# Patient Record
Sex: MALE | Race: WHITE | Employment: UNEMPLOYED | URBAN - METROPOLITAN AREA
[De-identification: names, ages, dates, MRNs, and addresses within clinical notes are randomized per-mention and may not be internally consistent; named-entity substitution may affect disease eponyms.]

---

## 2024-07-27 ENCOUNTER — APPOINTMENT (OUTPATIENT)
Dept: ULTRASOUND IMAGING | Facility: HOSPITAL | Age: 29
End: 2024-07-27
Attending: EMERGENCY MEDICINE
Payer: MEDICAID

## 2024-07-27 ENCOUNTER — HOSPITAL ENCOUNTER (INPATIENT)
Facility: HOSPITAL | Age: 29
LOS: 2 days | Discharge: HOME | End: 2024-07-29
Attending: EMERGENCY MEDICINE | Admitting: INTERNAL MEDICINE
Payer: MEDICAID

## 2024-07-27 DIAGNOSIS — B17.9 ACUTE HEPATITIS: Primary | ICD-10-CM

## 2024-07-27 DIAGNOSIS — R79.89 HIGH SERUM FERRITIN: ICD-10-CM

## 2024-07-27 DIAGNOSIS — F10.10 ALCOHOL ABUSE: ICD-10-CM

## 2024-07-27 PROBLEM — F10.20 UNCOMPLICATED ALCOHOL DEPENDENCE (HCC): Status: ACTIVE | Noted: 2024-07-27

## 2024-07-27 PROBLEM — D69.6 THROMBOCYTOPENIA (HCC): Status: ACTIVE | Noted: 2024-07-27

## 2024-07-27 LAB
ALBUMIN SERPL-MCNC: 3.6 G/DL (ref 3.2–4.8)
ALBUMIN/GLOB SERPL: 1.3 {RATIO} (ref 1–2)
ALP LIVER SERPL-CCNC: 328 U/L
ALT SERPL-CCNC: 155 U/L
AMPHET UR QL SCN: NEGATIVE
ANION GAP SERPL CALC-SCNC: 8 MMOL/L (ref 0–18)
APTT PPP: 22.7 SECONDS (ref 23–36)
AST SERPL-CCNC: 368 U/L (ref ?–34)
BASOPHILS # BLD AUTO: 0.03 X10(3) UL (ref 0–0.2)
BASOPHILS NFR BLD AUTO: 0.5 %
BENZODIAZ UR QL SCN: NEGATIVE
BILIRUB SERPL-MCNC: 17.6 MG/DL (ref 0.3–1.2)
BILIRUB UR QL CFM: POSITIVE
BUN BLD-MCNC: 9 MG/DL (ref 9–23)
CALCIUM BLD-MCNC: 9.3 MG/DL (ref 8.7–10.4)
CANNABINOIDS UR QL SCN: NEGATIVE
CERULOPLASMIN SERPL-MCNC: 29 MG/DL (ref 20–60)
CHLORIDE SERPL-SCNC: 102 MMOL/L (ref 98–112)
CK SERPL-CCNC: 285 U/L
CLARITY UR REFRACT.AUTO: CLEAR
CO2 SERPL-SCNC: 22 MMOL/L (ref 21–32)
COCAINE UR QL: NEGATIVE
CREAT BLD-MCNC: 0.99 MG/DL
CREAT UR-SCNC: 41 MG/DL
DEPRECATED HBV CORE AB SER IA-ACNC: 2067.7 NG/ML
EGFRCR SERPLBLD CKD-EPI 2021: 106 ML/MIN/1.73M2 (ref 60–?)
EOSINOPHIL # BLD AUTO: 0.03 X10(3) UL (ref 0–0.7)
EOSINOPHIL NFR BLD AUTO: 0.5 %
ERYTHROCYTE [DISTWIDTH] IN BLOOD BY AUTOMATED COUNT: 17.8 %
ETHANOL SERPL-MCNC: <3 MG/DL (ref ?–3)
FENTANYL UR QL SCN: NEGATIVE
GLOBULIN PLAS-MCNC: 2.8 G/DL (ref 2–3.5)
GLUCOSE BLD-MCNC: 114 MG/DL (ref 70–99)
GLUCOSE UR STRIP.AUTO-MCNC: NORMAL MG/DL
HAV IGM SER QL: NONREACTIVE
HBV CORE IGM SER QL: NONREACTIVE
HBV SURFACE AG SERPL QL IA: NONREACTIVE
HCT VFR BLD AUTO: 34.3 %
HCV AB SERPL QL IA: NONREACTIVE
HGB BLD-MCNC: 13 G/DL
IMM GRANULOCYTES # BLD AUTO: 0.04 X10(3) UL (ref 0–1)
IMM GRANULOCYTES NFR BLD: 0.7 %
INR BLD: 0.94 (ref 0.8–1.2)
IRON SATN MFR SERPL: 83 %
IRON SERPL-MCNC: 156 UG/DL
KETONES UR STRIP.AUTO-MCNC: NEGATIVE MG/DL
LEUKOCYTE ESTERASE UR QL STRIP.AUTO: NEGATIVE
LIPASE SERPL-CCNC: 78 U/L (ref 12–53)
LYMPHOCYTES # BLD AUTO: 0.67 X10(3) UL (ref 1–4)
LYMPHOCYTES NFR BLD AUTO: 11.7 %
MCH RBC QN AUTO: 31.2 PG (ref 26–34)
MCHC RBC AUTO-ENTMCNC: 37.9 G/DL (ref 31–37)
MCV RBC AUTO: 82.3 FL
MDMA UR QL SCN: NEGATIVE
MONOCYTES # BLD AUTO: 0.45 X10(3) UL (ref 0.1–1)
MONOCYTES NFR BLD AUTO: 7.8 %
NEUTROPHILS # BLD AUTO: 4.53 X10 (3) UL (ref 1.5–7.7)
NEUTROPHILS # BLD AUTO: 4.53 X10(3) UL (ref 1.5–7.7)
NEUTROPHILS NFR BLD AUTO: 78.8 %
NITRITE UR QL STRIP.AUTO: NEGATIVE
OPIATES UR QL SCN: NEGATIVE
OSMOLALITY SERPL CALC.SUM OF ELEC: 274 MOSM/KG (ref 275–295)
OXYCODONE UR QL SCN: NEGATIVE
PH UR STRIP.AUTO: 7 [PH] (ref 5–8)
PLATELET # BLD AUTO: 79 10(3)UL (ref 150–450)
PLATELET MORPHOLOGY: NORMAL
PLATELETS.RETICULATED NFR BLD AUTO: 5.6 % (ref 0–7)
POTASSIUM SERPL-SCNC: 3.2 MMOL/L (ref 3.5–5.1)
PROT SERPL-MCNC: 6.4 G/DL (ref 5.7–8.2)
PROT UR STRIP.AUTO-MCNC: NEGATIVE MG/DL
PROTHROMBIN TIME: 12.5 SECONDS (ref 11.6–14.8)
RBC # BLD AUTO: 4.17 X10(6)UL
RBC UR QL AUTO: NEGATIVE
SODIUM SERPL-SCNC: 132 MMOL/L (ref 136–145)
SP GR UR STRIP.AUTO: 1.01 (ref 1–1.03)
TOTAL IRON BINDING CAPACITY: 188 UG/DL (ref 250–425)
TRANSFERRIN SERPL-MCNC: 157 MG/DL (ref 215–365)
UROBILINOGEN UR STRIP.AUTO-MCNC: NORMAL MG/DL
WBC # BLD AUTO: 5.8 X10(3) UL (ref 4–11)

## 2024-07-27 PROCEDURE — 76700 US EXAM ABDOM COMPLETE: CPT | Performed by: EMERGENCY MEDICINE

## 2024-07-27 PROCEDURE — 99222 1ST HOSP IP/OBS MODERATE 55: CPT | Performed by: INTERNAL MEDICINE

## 2024-07-27 RX ORDER — ONDANSETRON 2 MG/ML
4 INJECTION INTRAMUSCULAR; INTRAVENOUS EVERY 6 HOURS PRN
Status: DISCONTINUED | OUTPATIENT
Start: 2024-07-27 | End: 2024-07-29

## 2024-07-27 RX ORDER — LORAZEPAM 1 MG/1
1 TABLET ORAL ONCE
Status: COMPLETED | OUTPATIENT
Start: 2024-07-27 | End: 2024-07-27

## 2024-07-27 RX ORDER — MULTIPLE VITAMINS W/ MINERALS TAB 9MG-400MCG
1 TAB ORAL DAILY
Status: DISCONTINUED | OUTPATIENT
Start: 2024-07-27 | End: 2024-07-29

## 2024-07-27 RX ORDER — LORAZEPAM 1 MG/1
1 TABLET ORAL
Status: DISCONTINUED | OUTPATIENT
Start: 2024-07-27 | End: 2024-07-29

## 2024-07-27 RX ORDER — MELATONIN
100 DAILY
Status: DISCONTINUED | OUTPATIENT
Start: 2024-07-28 | End: 2024-07-29

## 2024-07-27 RX ORDER — THIAMINE HYDROCHLORIDE 100 MG/ML
100 INJECTION, SOLUTION INTRAMUSCULAR; INTRAVENOUS ONCE
Status: COMPLETED | OUTPATIENT
Start: 2024-07-27 | End: 2024-07-27

## 2024-07-27 RX ORDER — FOLIC ACID 1 MG/1
1 TABLET ORAL DAILY
Status: DISCONTINUED | OUTPATIENT
Start: 2024-07-27 | End: 2024-07-29

## 2024-07-27 RX ORDER — ENOXAPARIN SODIUM 100 MG/ML
40 INJECTION SUBCUTANEOUS DAILY
Status: DISCONTINUED | OUTPATIENT
Start: 2024-07-27 | End: 2024-07-29

## 2024-07-27 RX ORDER — LORAZEPAM 1 MG/1
2 TABLET ORAL
Status: DISCONTINUED | OUTPATIENT
Start: 2024-07-27 | End: 2024-07-29

## 2024-07-27 RX ORDER — SODIUM CHLORIDE 9 MG/ML
INJECTION, SOLUTION INTRAVENOUS CONTINUOUS
Status: DISCONTINUED | OUTPATIENT
Start: 2024-07-27 | End: 2024-07-29

## 2024-07-27 RX ORDER — MELATONIN
3 NIGHTLY PRN
Status: DISCONTINUED | OUTPATIENT
Start: 2024-07-27 | End: 2024-07-29

## 2024-07-27 RX ORDER — POTASSIUM CHLORIDE 20 MEQ/1
40 TABLET, EXTENDED RELEASE ORAL ONCE
Status: COMPLETED | OUTPATIENT
Start: 2024-07-27 | End: 2024-07-27

## 2024-07-27 RX ORDER — POTASSIUM CHLORIDE 20 MEQ/1
40 TABLET, EXTENDED RELEASE ORAL ONCE
Status: DISCONTINUED | OUTPATIENT
Start: 2024-07-27 | End: 2024-07-27

## 2024-07-27 RX ORDER — PROCHLORPERAZINE EDISYLATE 5 MG/ML
5 INJECTION INTRAMUSCULAR; INTRAVENOUS EVERY 8 HOURS PRN
Status: DISCONTINUED | OUTPATIENT
Start: 2024-07-27 | End: 2024-07-29

## 2024-07-27 NOTE — ED PROVIDER NOTES
Patient Seen in: [facility] Emergency Department      History     Chief Complaint   Patient presents with    Pain     Gluteal pain bilaterally    Eye Visual Problem     States eyes are yellow    Fever     Feels feverish    Urinary Symptoms     States urine is orange     Stated Complaint: states \"yellow eyes\" and \"glutes hurt\" feels feverish    Subjective:   HPI    29-year-old male presents for evaluation of yellowing eyes over the past 7 days.  He felt weak yesterday at work.  States his back muscles hurt.  He feels feverish but has not checked his temperature.  Denies cough or cold.  Denies abdominal pain, vomiting or diarrhea.  Patient does admit to drinking at least 5 beers per day if not more.    Objective:   History reviewed. No pertinent past medical history.           History reviewed. No pertinent surgical history.             Social History     Socioeconomic History    Marital status: Single   Tobacco Use    Smoking status: Every Day     Types: Cigarettes    Smokeless tobacco: Never   Vaping Use    Vaping status: Never Used   Substance and Sexual Activity    Alcohol use: Yes     Comment: 5 beers a day    Drug use: Never              Review of Systems    Positive for stated Chief Complaint: Pain (Gluteal pain bilaterally), Eye Visual Problem (States eyes are yellow), Fever (Feels feverish), and Urinary Symptoms (States urine is orange)    Other systems are as noted in HPI.  Constitutional and vital signs reviewed.      All other systems reviewed and negative except as noted above.    Physical Exam     ED Triage Vitals [07/27/24 0848]   /86   Pulse 82   Resp 18   Temp 97 °F (36.1 °C)   Temp src Temporal   SpO2 98 %   O2 Device None (Room air)       Current Vitals:   Vital Signs  BP: 139/89  Pulse: 78  Resp: 19  Temp: 97 °F (36.1 °C)  Temp src: Temporal  MAP (mmHg): (!) 104    Oxygen Therapy  SpO2: 98 %  O2 Device: None (Room air)            Physical Exam    General: Alert, oriented, no apparent  distress  HEENT:  Pupils equal reactive.  Extraocular motions intact. MMM.  Scleral icterus  Neck: Supple  Lungs: Clear to auscultation bilaterally.  Heart: Regular rate and rhythm.  Abdomen: Soft, nontender.   Skin: No rash.  No edema.  Neurologic: No focal neurologic deficits.  Normal speech pattern  Musculoskeletal: No tenderness or deformity noted.  Full range of motion throughout.        ED Course     Labs Reviewed   COMP METABOLIC PANEL (14) - Abnormal; Notable for the following components:       Result Value    Glucose 114 (*)     Sodium 132 (*)     Potassium 3.2 (*)     Calculated Osmolality 274 (*)      (*)      (*)     Alkaline Phosphatase 328 (*)     Bilirubin, Total 17.6 (*)     All other components within normal limits   LIPASE - Abnormal; Notable for the following components:    Lipase 78 (*)     All other components within normal limits   PTT, ACTIVATED - Abnormal; Notable for the following components:    PTT 22.7 (*)     All other components within normal limits   CK CREATINE KINASE (NOT CREATININE) - Abnormal; Notable for the following components:     (*)     All other components within normal limits   RBC MORPHOLOGY SCAN - Abnormal; Notable for the following components:    RBC Morphology See morphology below (*)     All other components within normal limits   ICTOTEST - Abnormal; Notable for the following components:    Ictotest Positive (*)     All other components within normal limits   CBC W/ DIFFERENTIAL - Abnormal; Notable for the following components:    RBC 4.17 (*)     HCT 34.3 (*)     PLT 79.0 (*)     MCHC 37.9 (*)     Lymphocyte Absolute 0.67 (*)     All other components within normal limits   PROTHROMBIN TIME (PT) - Normal   ETHYL ALCOHOL - Normal   URINALYSIS WITH CULTURE REFLEX   CBC WITH DIFFERENTIAL WITH PLATELET    Narrative:     The following orders were created for panel order CBC With Differential With Platelet.  Procedure                                Abnormality         Status                     ---------                               -----------         ------                     CBC W/ DIFFERENTIAL[[id number]]          Abnormal            Final result                 Please view results for these tests on the individual orders.   SCAN SLIDE   HEPATITIS PANEL, ACUTE (4)   RAINBOW DRAW LAVENDER   RAINBOW DRAW LIGHT GREEN   RAINBOW DRAW BLUE   RAINBOW DRAW GOLD                      MDM      Differential diagnosis includes acute hepatitis, pancreatitis, metabolic disturbance, rhabdomyolysis    Chemistry with slightly low potassium and sodium.  IV fluids given.  Potassium repleted.    LFTs elevated including a total bilirubin of 17.6.    Lipase just above normal limits.    CK just above normal limits      INR normal      US ABDOMEN COMPLETE (CPT=76700)    Result Date: 7/27/2024  PROCEDURE:  US ABDOMEN COMPLETE (CPT=76700)  COMPARISON:  None.  INDICATIONS:  Patient presents with jaundice.  TECHNIQUE:  Real time gray-scale ultrasound was used to evaluate the abdomen.  The exam includes images of the liver, gallbladder, common bile duct, pancreas, spleen, kidneys, IVC, and aorta.  PATIENT STATED HISTORY: (As transcribed by Technologist)  Patient with yellowing of the eyes.    FINDINGS:  LIVER:  There is coarsening of the hepatic echotexture suspicious for fatty infiltration.  No focal hepatic lesions are noted. BILIARY:  The gallbladder is contracted and suboptimally evaluated.  No discrete evidence of gallstones.  No biliary ductal dilatation, CBD measures 4 mm in diameter. PANCREAS:  The pancreas is partially obscured due to overlying bowel gas. SPLEEN:  Normal. KIDNEYS:  Normal.  Right kidney measures 11.2 cm.  Left kidney measures 9.4 cm.  There is an incidental simple cyst suggested along the mid to inferior pole the left kidney measuring 1.7 cm. AORTA/IVC:  Normal.             CONCLUSION:  1. Coarsening of the hepatic echotexture suspicious for fatty  infiltration.  No focal hepatic lesions. 2. Gallbladder is contracted and suboptimally evaluated.  No gross evidence of gallstones.  No biliary ductal dilatation noted. 3. Pancreas is partially obscured due to overlying bowel gas. 4. Simple cyst along the mid to inferior pole the left kidney measuring 1.7 cm.   LOCATION:  [facility]    Dictated by (CST): Jake Leach DO on 7/27/2024 at 10:48 AM     Finalized by (CST): Jake Leach DO on 7/27/2024 at 10:52 AM             Hepatitis panel ordered        Spoke with Dr. Lundy.  Requests admission, observation, rule out fulminant hepatic failure    Spoke with Dr. Lara for admission    Medical Decision Making      Disposition and Plan     Clinical Impression:  1. Acute hepatitis    2. Alcohol abuse         Disposition:  There is no disposition on file for this visit.  There is no disposition time on file for this visit.    Follow-up:  No follow-up provider specified.        Medications Prescribed:  There are no discharge medications for this patient.

## 2024-07-27 NOTE — ED QUICK NOTES
Orders for admission, patient is aware of plan and ready to go upstairs. Any questions, please call ED RN Michele at extension [phone].     Patient Covid vaccination status: Unvaccinated     COVID Test Ordered in ED: None    COVID Suspicion at Admission: N/A    Running Infusions:  None    Mental Status/LOC at time of transport: A&Ox4    Other pertinent information:   CIWA score: N/A   NIH score:  N/A

## 2024-07-27 NOTE — PLAN OF CARE
Received patient from ED.  Alert and oriented x 4.  Scottish speaking.  NSR on telemetry.  0.9 NS infusing at 100 ml./hr.  Room air.  Oriented to room.  CIWA protocol.  Safety fall precautions initiated.  Seizure precautions.  Needs attended, call light within his reach.  Bed in lowest position, bed alarm is on.  Problem: Patient/Family Goals  Goal: Patient/Family Long Term Goal  Description: Patient's Long Term Goal: discharge to home.    Interventions:  - GI follows  - See additional Care Plan goals for specific interventions  Outcome: Progressing  Goal: Patient/Family Short Term Goal  Description: Patient's Short Term Goal: stable health condition    Interventions:   - administer medications as prescribed.  -monitor vital signs, mental status.  -monitor for signs and symptoms of alcohol withdrawal  -education on alcohol and cigarette cessation.  - See additional Care Plan goals for specific interventions  Outcome: Progressing

## 2024-07-27 NOTE — H&P
[facility]                                                               [facility]         Garcia Barajas Patient Status:  Inpatient    1995 MRN [id number]   Location [facility] 3NE-A Attending Patricia Lara MD   Hosp Day # 0 PCP No primary care provider on file.     Chief Complaint: Yellow eyes, feeling feverish, dark urine    History of Present Illness:  Garcia Barajas is a 29 year old male admitted with yellow eyes, feeling feverish, dark urine for 1 week.  Patient seen with patient's nurse and also RN Nikki interpreting Latvian for patient.  Patient states she recently traveled here from [place].  Patient admits to drinking 5 beers a day for the last month.  Patient states his last drink was couple of days back.  Patient states he also feels that his glutes are hurting.  Patient states his oranges dark in color and almost dark yellow like almost orangish.  Denies any nausea vomiting.  Denies any diarrhea.  Denies any abdominal pain.      History:  History reviewed. No pertinent past medical history.    History reviewed. No pertinent surgical history.    Family history:  History reviewed. No pertinent family history.     Social history:   reports that he has been smoking cigarettes. He has never used smokeless tobacco. He reports current alcohol use. He reports that he does not use drugs.    Allergies:  No Known Allergies    Home Medications:  No medications prior to admission.       Review of Systems:  A comprehensive 14 point review of systems was completed.  Pertinent positives and negatives noted in the the HPI.    Physical Exam:     Vital signs: Blood pressure 120/86, pulse 75, temperature 98.6 °F (37 °C), resp. rate 23, height 5' 5\" (1.651 m), weight 137 lb 3.2 oz (62.2 kg), SpO2 97%.  General: No acute distress.  Jaundice present  HEENT: Moist mucous membranes.  Scleral icterus present  Respiratory: Clear to auscultation bilaterally.  No wheezes. No  rhonchi.  Cardiovascular: S1, S2.  Regular rate and rhythm.   Abdomen: Soft, nontender, nondistended.  Positive bowel sounds.   Neurologic: Awake alert, no focal neurological deficits.  Musculoskeletal: Full range of motion of all extremities.  No pedal edema or calf tenderness  Integument: Jaundice present  Psychiatric: Appropriate mood and affect.      Diagnostic Data:      Laboratory Data:   Lab Results   Component Value Date    WBC 5.8 07/27/2024    HGB 13.0 07/27/2024    HCT 34.3 07/27/2024    PLT 79.0 07/27/2024    CREATSERUM 0.99 07/27/2024    BUN 9 07/27/2024     07/27/2024    K 3.2 07/27/2024     07/27/2024    CO2 22.0 07/27/2024     07/27/2024    CA 9.3 07/27/2024    ALB 3.6 07/27/2024    ALKPHO 328 07/27/2024    BILT 17.6 07/27/2024    TP 6.4 07/27/2024     07/27/2024     07/27/2024    PTT 22.7 07/27/2024    INR 0.94 07/27/2024    PTP 12.5 07/27/2024    LIP 78 07/27/2024     07/27/2024    ETOH <3 07/27/2024       Recent Labs   Lab 07/27/24  0903   PTP 12.5   INR 0.94       Recent Labs   Lab 07/27/24  0903   *     Imaging:  Imaging data reviewed in Epic.  Ultrasound of the abdomen shows  CONCLUSION:    1. Coarsening of the hepatic echotexture suspicious for fatty infiltration.  No focal hepatic lesions.   2. Gallbladder is contracted and suboptimally evaluated.  No gross evidence of gallstones.  No biliary ductal dilatation noted.   3. Pancreas is partially obscured due to overlying bowel gas.   4. Simple cyst along the mid to inferior pole the left kidney measuring 1.7 cm.         ASSESSMENT / PLAN:     #Elevated liver function test, jaundice, possible alcoholic hepatitis  #Alcohol dependence  Alcohol level less than 3 at this time  Urine drug screen negative  Acute hepatitis C including hepatitis A, B and C nonreactive  Advised quit alcohol  GI consult  Follow CMP in a.m.  INR normal at this time  Monitor for any signs of any withdrawal, [facility] protocol with  Ativan if needed  #Thrombocytopenia due to liver disease  Follow CBC in a.m.    Quality:  DVT Prophylaxis: DVT Mechanical Prophylaxis:   SCDs,    DVT Pharmacologic Prophylaxis   Medication    enoxaparin (Lovenox) 40 MG/0.4ML SUBQ injection 40 mg              CODE status:   Code Status: Not on file  Hill: No urinary catheter in place      Plan of care discussed with patient with help of ALYSSA Jordan at bedside translating Peruvian for patient per patient's wishes    Discussed with ER Physician.        Patricia Lara MD  7/27/2024  2:12 PM

## 2024-07-28 ENCOUNTER — APPOINTMENT (OUTPATIENT)
Dept: CT IMAGING | Facility: HOSPITAL | Age: 29
End: 2024-07-28
Attending: INTERNAL MEDICINE
Payer: MEDICAID

## 2024-07-28 PROBLEM — R74.8 ELEVATED CK: Status: ACTIVE | Noted: 2024-07-28

## 2024-07-28 PROBLEM — R17 JAUNDICE: Status: ACTIVE | Noted: 2024-07-28

## 2024-07-28 LAB
ALBUMIN SERPL-MCNC: 3.2 G/DL (ref 3.2–4.8)
ALBUMIN/GLOB SERPL: 1.3 {RATIO} (ref 1–2)
ALP LIVER SERPL-CCNC: 304 U/L
ALT SERPL-CCNC: 183 U/L
ANION GAP SERPL CALC-SCNC: 5 MMOL/L (ref 0–18)
AST SERPL-CCNC: 433 U/L (ref ?–34)
BASOPHILS # BLD AUTO: 0.05 X10(3) UL (ref 0–0.2)
BASOPHILS NFR BLD AUTO: 0.9 %
BILIRUB SERPL-MCNC: 14.8 MG/DL (ref 0.3–1.2)
BUN BLD-MCNC: 5 MG/DL (ref 9–23)
CALCIUM BLD-MCNC: 9.1 MG/DL (ref 8.7–10.4)
CHLORIDE SERPL-SCNC: 104 MMOL/L (ref 98–112)
CK SERPL-CCNC: 73 U/L
CO2 SERPL-SCNC: 25 MMOL/L (ref 21–32)
CREAT BLD-MCNC: 0.85 MG/DL
EGFRCR SERPLBLD CKD-EPI 2021: 121 ML/MIN/1.73M2 (ref 60–?)
EOSINOPHIL # BLD AUTO: 0.08 X10(3) UL (ref 0–0.7)
EOSINOPHIL NFR BLD AUTO: 1.4 %
ERYTHROCYTE [DISTWIDTH] IN BLOOD BY AUTOMATED COUNT: 18.4 %
GLOBULIN PLAS-MCNC: 2.5 G/DL (ref 2–3.5)
GLUCOSE BLD-MCNC: 89 MG/DL (ref 70–99)
HCT VFR BLD AUTO: 34.1 %
HGB BLD-MCNC: 12.2 G/DL
IMM GRANULOCYTES # BLD AUTO: 0.06 X10(3) UL (ref 0–1)
IMM GRANULOCYTES NFR BLD: 1 %
LYMPHOCYTES # BLD AUTO: 1.24 X10(3) UL (ref 1–4)
LYMPHOCYTES NFR BLD AUTO: 21.2 %
MCH RBC QN AUTO: 31.9 PG (ref 26–34)
MCHC RBC AUTO-ENTMCNC: 35.8 G/DL (ref 31–37)
MCV RBC AUTO: 89 FL
MONOCYTES # BLD AUTO: 0.42 X10(3) UL (ref 0.1–1)
MONOCYTES NFR BLD AUTO: 7.2 %
NEUTROPHILS # BLD AUTO: 4 X10 (3) UL (ref 1.5–7.7)
NEUTROPHILS # BLD AUTO: 4 X10(3) UL (ref 1.5–7.7)
NEUTROPHILS NFR BLD AUTO: 68.3 %
OSMOLALITY SERPL CALC.SUM OF ELEC: 275 MOSM/KG (ref 275–295)
PLATELET # BLD AUTO: 82 10(3)UL (ref 150–450)
PLATELETS.RETICULATED NFR BLD AUTO: 6.9 % (ref 0–7)
POTASSIUM SERPL-SCNC: 3.7 MMOL/L (ref 3.5–5.1)
POTASSIUM SERPL-SCNC: 3.7 MMOL/L (ref 3.5–5.1)
PROT SERPL-MCNC: 5.7 G/DL (ref 5.7–8.2)
RBC # BLD AUTO: 3.83 X10(6)UL
SODIUM SERPL-SCNC: 134 MMOL/L (ref 136–145)
WBC # BLD AUTO: 5.9 X10(3) UL (ref 4–11)

## 2024-07-28 PROCEDURE — 99232 SBSQ HOSP IP/OBS MODERATE 35: CPT | Performed by: INTERNAL MEDICINE

## 2024-07-28 PROCEDURE — 74177 CT ABD & PELVIS W/CONTRAST: CPT | Performed by: INTERNAL MEDICINE

## 2024-07-28 NOTE — PROGRESS NOTES
[facility]   part of [facility]     Hospitalist Progress Note     Garcia Barajas Patient Status:  Inpatient    1995 MRN [id number]   Location [facility] 3NE-A Attending Patricia Lara MD   Hosp Day # 1 PCP No primary care provider on file.     Chief Complaint: Yellow eyes, feeling feverish, dark urine, gluteal pain    Subjective:     Patient states he feels a little better than yesterday .  Patient seen with the help of Latvian telemetry  Valorie ID number [id number] translating for patient.  Patient seen with RN at bedside.  Patient presents to have jaundice with yellow eyes, dark urine and due to pain and also complains of some right upper quadrant abdominal pain    Objective:    Review of Systems:   A comprehensive review of systems was completed; pertinent positive and negatives stated in subjective.    Vital signs:  Temp:  [98.1 °F (36.7 °C)-99.3 °F (37.4 °C)] 98.4 °F (36.9 °C)  Pulse:  [63-76] 69  Resp:  [16-18] 17  BP: (104-125)/(61-81) 125/79  SpO2:  [95 %-98 %] 98 %    Physical Exam:    /79 (BP Location: Left arm)   Pulse 69   Temp 98.4 °F (36.9 °C) (Oral)   Resp 17   Ht 5' 5\" (1.651 m)   Wt 137 lb 3.2 oz (62.2 kg)   SpO2 98%   BMI 22.83 kg/m²     General: No acute distress.  Jaundice present  HEENT: Moist mucous membranes.  Scleral icterus present  Respiratory: Clear to auscultation bilaterally.  No wheezes. No rhonchi.  Cardiovascular: S1, S2.  Regular rate and rhythm.   Abdomen: Soft, nontender, nondistended.  Positive bowel sounds.   Neurologic: Awake alert, no focal neurological deficits.  Musculoskeletal: Full range of motion of all extremities.  No pedal edema or calf tenderness  Integument: Jaundice present  Psychiatric: Appropriate mood and affect.    Diagnostic Data:    Labs:  Recent Labs   Lab 24  0924  0752   WBC 5.8 5.9   HGB 13.0 12.2*   MCV 82.3 89.0   PLT 79.0* 82.0*   INR 0.94  --        Recent Labs   Lab 24  0903  07/28/24  0752   * 89   BUN 9 5*   CREATSERUM 0.99 0.85   CA 9.3 9.1   ALB 3.6 3.2   * 134*   K 3.2* 3.7  3.7    104   CO2 22.0 25.0   ALKPHO 328* 304*   * 433*   * 183*   BILT 17.6* 14.8*   TP 6.4 5.7       Estimated Creatinine Clearance: 112.5 mL/min (based on SCr of 0.85 mg/dL).    Recent Labs   Lab 07/27/24  0903   *       Recent Labs   Lab 07/27/24  0903   PTP 12.5   INR 0.94                  Microbiology    No results found for this visit on 07/27/24.      Imaging: Reviewed in Epic.    Medications:    enoxaparin  40 mg Subcutaneous Daily    thiamine  100 mg Oral Daily    multivitamin with minerals  1 tablet Oral Daily    folic acid  1 mg Oral Daily       Assessment & Plan:        #Elevated liver function test, jaundice, possible alcoholic hepatitis  #Alcohol dependence  Alcohol level less than 3 at this time  Urine drug screen negative  Acute hepatitis C including hepatitis A, B and C nonreactive  Advised quit alcohol, patient verbalized understanding  GI consult  Total bilirubin improving, AST and ALT higher today.  Follow CMP in a.m.  INR normal at this time  No signs of alcohol withdrawal in last 24 hours as reported by RN, will plan to discontinue CIWA protocol with as needed Ativan  Iron studies showed elevated ferritin and iron saturation.  Hemochromatosis DNA ordered by GI pending  Discussed in detail with patient regarding need to follow-up with gastroenterologist and with regular outpatient primary care physician or [facility]CC clinic after discharge to follow-up on the workup ordered by gastroenterologist which is still pending, patient verbalized understanding.  Used [place]ican telemetry  to communicate this with patient and patient has no questions regarding this.  #Thrombocytopenia due to liver disease  Stable  #Gluteal pain, elevated CK on admission  Will check CT abdomen and pelvis  On IV fluids  Follow repeat CK level    Discussed with patient, discussed  with RN.   for discharge planning.    Patient seen with the help of Arabic telemetry  Valorie ID number [id number] translating for patient.     Patricia Lara MD    Supplementary Documentation:     Quality:  DVT Mechanical Prophylaxis:   SCDs,    DVT Pharmacologic Prophylaxis   Medication    enoxaparin (Lovenox) 40 MG/0.4ML SUBQ injection 40 mg                Code Status: Not on file  Hill: No urinary catheter in place  Hill Duration (in days):   Central line:    SOPHIE:     Discharge is dependent on: Clinical progress  At this point Mr. Cristiana Barajas is expected to be discharge to: Home    The 21st Century Cures Act makes medical notes like these available to patients in the interest of transparency. Please be advised this is a medical document. Medical documents are intended to carry relevant information, facts as evident, and the clinical opinion of the practitioner. The medical note is intended as peer to peer communication and may appear blunt or direct. It is written in medical language and may contain abbreviations or verbiage that are unfamiliar.

## 2024-07-28 NOTE — DISCHARGE INSTRUCTIONS
No alcohol.  No Tylenol products.  Stay hydrated.    General Medical Follow up:  [facility] (healthcare clinic) [email or web address]  [facility] welcomes patients with Medicaid, Medicare, private insurance or no insurance at all, possibly at a discounted rate. [facility] offers low cost prescriptions drugs when seen by a [facility] physician.   [facility] [address] [place] [address] [phone]  [facility] [address], [place] [address] [phone]  [facility] [address] [place] [address] [phone]  Luis bermeo caryn aqui o llame al teléfono [phone] o al [phone].

## 2024-07-28 NOTE — PLAN OF CARE
Assumed pt care at 1930  Pt is A&Ox4, following commands.   Pt on room air, VSS.  NSR  Pt denies pain.  Pt up ad velia.  Pt on regular diet. Tolerating diet.   R AC NS at 100 mL/hr.  Ciwa q2  Seizure precautions  Non cardiac electrolyte protocol  Bed in lowest position, call light in reach.     Problem: SAFETY ADULT - FALL  Goal: Free from fall injury  Description: INTERVENTIONS:  - Assess pt frequently for physical needs  - Identify cognitive and physical deficits and behaviors that affect risk of falls.  - [place] fall precautions as indicated by assessment.  - Educate pt/family on patient safety including physical limitations  - Instruct pt to call for assistance with activity based on assessment  - Modify environment to reduce risk of injury  - Provide assistive devices as appropriate  - Consider OT/PT consult to assist with strengthening/mobility  - Encourage toileting schedule  Outcome: Progressing

## 2024-07-28 NOTE — CONSULTS
Consultation Note        Garcia Barajas Patient Status:  Inpatient    1995 MRN [id number]   Formerly [facility] 3NE-A Attending Patricia Lara MD   Hosp Day # 1 PCP No primary care provider on file.       Reason for Consultation     Elevated liver enzymes       History of Present Illness   -Patient is a 30 y/o male with no known PMH who presented to the ER with new onset jaundice, bilateral eye pain gluteal pain and orange urine  -No abdominal pain, vomiting, fever, hematemesis or hematochezia  -First episode of this happening  -Reports drinking heavy alcohol 5-7 beers a day for past 30 days  -Recently moved from [place] 3 days ago, states he stopped drinking then  -Currently living with a friend here, works in a kitchen, plans to return to [place] on discharge  -No FH of liver disease, no prior h/o hepatitis  -Denies any over the counter meds, supplements         PMH/MEDS/ALLERGIES/SH/FH:     History reviewed. No pertinent past medical history.   History reviewed. No pertinent surgical history.     No recently discontinued medications to reconcile   No current facility-administered medications on file prior to encounter.     No current outpatient medications on file prior to encounter.       Current Allergies: No Known Allergies    Social History     Occupational History    Not on file   Tobacco Use    Smoking status: Every Day     Types: Cigarettes    Smokeless tobacco: Never   Vaping Use    Vaping status: Never Used   Substance and Sexual Activity    Alcohol use: Yes     Comment: 5 beers a day    Drug use: Never    Sexual activity: Not on file       Marital Status:    Lives alone?:    Occupation:   Taking fiber supplements?:    Tattoos?:   Body piercing?:   High risk sexual behavior?:    Advance Directive:          History reviewed. No pertinent family history.           MEDICATIONS      [COMPLETED] potassium chloride (Klor-Con M20) tab 40 mEq  40 mEq Oral Once    [COMPLETED]  sodium chloride 0.9 % IV bolus 1,000 mL  1,000 mL Intravenous Once    [COMPLETED] LORazepam (Ativan) tab 1 mg  1 mg Oral Once    sodium chloride 0.9% infusion   Intravenous Continuous    enoxaparin (Lovenox) 40 MG/0.4ML SUBQ injection 40 mg  40 mg Subcutaneous Daily    LORazepam (Ativan) tab 1 mg  1 mg Oral Q1H PRN    Or    LORazepam (Ativan) tab 2 mg  2 mg Oral Q1H PRN    melatonin tab 3 mg  3 mg Oral Nightly PRN    ondansetron (Zofran) 4 MG/2ML injection 4 mg  4 mg Intravenous Q6H PRN    prochlorperazine (Compazine) 10 MG/2ML injection 5 mg  5 mg Intravenous Q8H PRN    [COMPLETED] thiamine 100 mg/mL injection 100 mg  100 mg Intravenous Once    thiamine (Vitamin B1) tab 100 mg  100 mg Oral Daily    multivitamin with minerals (Thera M Plus) tab 1 tablet  1 tablet Oral Daily    folic acid (Folvite) tab 1 mg  1 mg Oral Daily              ROS:     A comprehensive 14 point review of systems was completed.  Pertinent positives and negatives noted in the the HPI.          Physical Exam     Vital signs:  /67   Pulse 74   Temp 98.4 °F (36.9 °C) (Oral)   Resp 18   Ht 5' 5\" (1.651 m)   Wt 137 lb 3.2 oz (62.2 kg)   SpO2 98%   BMI 22.83 kg/m²         Physical Exam        General: Appears alert, oriented x 3 and in no acute distress.  HEENT: Normal. + scleral icterus.   NECK: Supple. No neck vein distention. Thyroid not enlarged. No lymphadenopathy.  CV: S1 and S2 normal. No murmurs or gallops.  LUNGS: Clear to percussion and auscultation.  ABDOMEN: Soft and non-distended. Non-tender. No masses. Bowel sounds are present.  BACK: No CVA tenderness.  EXTREMITIES: No edema, cyanosis or clubbing.  SKIN: Warm and dry.         IMAGING/LABS       Labs:   Lab Results   Component Value Date    WBC 5.9 07/28/2024    HGB 12.2 07/28/2024    HCT 34.1 07/28/2024    PLT 82.0 07/28/2024    CREATSERUM 0.85 07/28/2024    BUN 5 07/28/2024     07/28/2024    K 3.7 07/28/2024    K 3.7 07/28/2024     07/28/2024    CO2 25.0  07/28/2024    GLU 89 07/28/2024    CA 9.1 07/28/2024    ALB 3.2 07/28/2024    ALKPHO 304 07/28/2024    BILT 14.8 07/28/2024     07/28/2024     07/28/2024     Recent Labs   Lab 07/27/24  0903 07/28/24  0752   * 89   BUN 9 5*   CREATSERUM 0.99 0.85   CA 9.3 9.1   * 134*   K 3.2* 3.7  3.7    104   CO2 22.0 25.0     Recent Labs   Lab 07/28/24  0752   RBC 3.83*   HGB 12.2*   HCT 34.1*   MCV 89.0   MCH 31.9   MCHC 35.8   RDW 18.4   NEPRELIM 4.00   WBC 5.9   PLT 82.0*       Recent Labs   Lab 07/27/24  0903 07/28/24  0752   * 183*   * 433*       Imaging:   US ABDOMEN COMPLETE (CPT=76700)  Narrative: PROCEDURE:  US ABDOMEN COMPLETE (CPT=76700)     COMPARISON:  None.     INDICATIONS:  Patient presents with jaundice.     TECHNIQUE:  Real time gray-scale ultrasound was used to evaluate the abdomen.  The exam includes images of the liver, gallbladder, common bile duct, pancreas, spleen, kidneys, IVC, and aorta.     PATIENT STATED HISTORY: (As transcribed by Technologist)  Patient with yellowing of the eyes.         FINDINGS:    LIVER:  There is coarsening of the hepatic echotexture suspicious for fatty infiltration.  No focal hepatic lesions are noted.  BILIARY:  The gallbladder is contracted and suboptimally evaluated.  No discrete evidence of gallstones.  No biliary ductal dilatation, CBD measures 4 mm in diameter.  PANCREAS:  The pancreas is partially obscured due to overlying bowel gas.  SPLEEN:  Normal.  KIDNEYS:  Normal.  Right kidney measures 11.2 cm.  Left kidney measures 9.4 cm.  There is an incidental simple cyst suggested along the mid to inferior pole the left kidney measuring 1.7 cm.   AORTA/IVC:  Normal.                      Impression: CONCLUSION:    1. Coarsening of the hepatic echotexture suspicious for fatty infiltration.  No focal hepatic lesions.  2. Gallbladder is contracted and suboptimally evaluated.  No gross evidence of gallstones.  No biliary ductal  dilatation noted.  3. Pancreas is partially obscured due to overlying bowel gas.  4. Simple cyst along the mid to inferior pole the left kidney measuring 1.7 cm.         LOCATION:  [facility]           Dictated by (CST): Jake Leach DO on 7/27/2024 at 10:48 AM       Finalized by (CST): Jake Leach DO on 7/27/2024 at 10:52 AM               IMPRESSION:     30 y/o male with no sig PMH presenting with acute transaminitis and bilirubin of 17, most consistent with alcoholic hepatitis, iron studies show high ferritin and iron sat of 83%-may have underlying iron overload       - Has no signs of GI bleed, encephalopathy      -  Has thrombocytopenia, which can be acute in nature, liver does not appear cirrhotic on US       -Maddrey discriminant score is < 20 - low risk for hepatic failure           PLAN:     -Add Hemochromatosis gene analysis to blood in lab  -Long conversation regarding importance of complete abstinence from alcohol  -As he is asymptomatic and bilirubin is improving, he can be discharged today with close outpatient follow up of liver testing as feasible  -Will follow up on remainder of labs as an outpatient  -Plan of care d/w ALYSSA Lundy MD  11:49 AM  7/28/2024  [facility]  [phone]

## 2024-07-28 NOTE — PLAN OF CARE
Pt Aox4, NSR, room air, VSS.  Maori-speaking  CIWA protocol, no withdrawal symptoms.  Seizure and fall precautions in place  Regular diet with good appetite  Complaint of mild pain to right abdomen  0.9 normal saline infusing at 100 ml/hr  GI consult to see  Continue to monitor.    Problem: SAFETY ADULT - FALL  Goal: Free from fall injury  Description: INTERVENTIONS:  - Assess pt frequently for physical needs  - Identify cognitive and physical deficits and behaviors that affect risk of falls.  - Gary fall precautions as indicated by assessment.  - Educate pt/family on patient safety including physical limitations  - Instruct pt to call for assistance with activity based on assessment  - Modify environment to reduce risk of injury  - Provide assistive devices as appropriate  - Consider OT/PT consult to assist with strengthening/mobility  - Encourage toileting schedule  Outcome: Progressing

## 2024-07-28 NOTE — CM/SW NOTE
Orders received for SDOH and discharge planning. Discussed with RN for clarification and she mentioned alcohol resources which are already added to AVS.     She reports pt lives here part time for work and goes back and forth to his home in [place]. He stays with local family/friends  Added [facility] resources to AVS for medical/labs follow up.    / to remain available for support and/or discharge planning.     Laila Boucher MBA MSN, RN CTL/  [id number]

## 2024-07-29 VITALS
BODY MASS INDEX: 22.86 KG/M2 | OXYGEN SATURATION: 96 % | HEART RATE: 67 BPM | DIASTOLIC BLOOD PRESSURE: 70 MMHG | SYSTOLIC BLOOD PRESSURE: 114 MMHG | HEIGHT: 65 IN | RESPIRATION RATE: 18 BRPM | WEIGHT: 137.19 LBS | TEMPERATURE: 98 F

## 2024-07-29 LAB
ALBUMIN SERPL-MCNC: 3.3 G/DL (ref 3.2–4.8)
ALBUMIN/GLOB SERPL: 1.2 {RATIO} (ref 1–2)
ALP LIVER SERPL-CCNC: 348 U/L
ALT SERPL-CCNC: 203 U/L
ANION GAP SERPL CALC-SCNC: 4 MMOL/L (ref 0–18)
AST SERPL-CCNC: 400 U/L (ref ?–34)
BILIRUB SERPL-MCNC: 10.9 MG/DL (ref 0.3–1.2)
BUN BLD-MCNC: 6 MG/DL (ref 9–23)
CALCIUM BLD-MCNC: 9.3 MG/DL (ref 8.7–10.4)
CHLORIDE SERPL-SCNC: 102 MMOL/L (ref 98–112)
CO2 SERPL-SCNC: 25 MMOL/L (ref 21–32)
CREAT BLD-MCNC: 0.76 MG/DL
DSDNA IGG SERPL IA-ACNC: 0.7 IU/ML
EGFRCR SERPLBLD CKD-EPI 2021: 126 ML/MIN/1.73M2 (ref 60–?)
ENA AB SER QL IA: 0.1 UG/L
ENA AB SER QL IA: NEGATIVE
GLOBULIN PLAS-MCNC: 2.7 G/DL (ref 2–3.5)
GLUCOSE BLD-MCNC: 92 MG/DL (ref 70–99)
OSMOLALITY SERPL CALC.SUM OF ELEC: 269 MOSM/KG (ref 275–295)
POTASSIUM SERPL-SCNC: 3.8 MMOL/L (ref 3.5–5.1)
PROT SERPL-MCNC: 6 G/DL (ref 5.7–8.2)
SODIUM SERPL-SCNC: 131 MMOL/L (ref 136–145)

## 2024-07-29 PROCEDURE — 99239 HOSP IP/OBS DSCHRG MGMT >30: CPT | Performed by: INTERNAL MEDICINE

## 2024-07-29 NOTE — PLAN OF CARE
Assumed care of 30 y/o male @1930  A/Px4, RA  NSR-Tele, non-cardiac EP  Regular diet  Voids, up Adlib  Denies pain  RF PIV-infusing 0.9 @100 ml/hr  Safety precautions maintained and all needs met        Problem: SAFETY ADULT - FALL  Goal: Free from fall injury  Description: INTERVENTIONS:  - Assess pt frequently for physical needs  - Identify cognitive and physical deficits and behaviors that affect risk of falls.  - [place] fall precautions as indicated by assessment.  - Educate pt/family on patient safety including physical limitations  - Instruct pt to call for assistance with activity based on assessment  - Modify environment to reduce risk of injury  - Provide assistive devices as appropriate  - Consider OT/PT consult to assist with strengthening/mobility  - Encourage toileting schedule  Outcome: Progressing

## 2024-07-29 NOTE — PROGRESS NOTES
[facility]   part of [facility]     Hospitalist Progress Note     Garcia Barajas Patient Status:  Inpatient    1995 MRN [id number]   Location [facility] [place] [facility] 3NE-A Attending Patricia Lara MD   Hosp Day # 2 PCP No primary care provider on file.     Chief Complaint: Yellow eyes, feeling feverish, dark urine, gluteal pain    Subjective:   Had some pain at the site of the Lovenox injection yesterday, resolved today according to patient  Patient states he feels a little better than yesterday .Patient seen with ALYSSA Rodríguez at bedside who can speak Vietnamese well and interpreting for patient per patient's wishes.  jaundice with yellow eyes, dark urine improving.  Right upper quadrant abdominal pain resolved today.  Tolerating diet.  Gluteal pain resolved.  Eager to go home.    Objective:    Review of Systems:   A comprehensive review of systems was completed; pertinent positive and negatives stated in subjective.    Vital signs:  Temp:  [97.6 °F (36.4 °C)-99.2 °F (37.3 °C)] 97.9 °F (36.6 °C)  Pulse:  [66-87] 67  Resp:  [16-18] 18  BP: (111-132)/(70-87) 114/70  SpO2:  [96 %-99 %] 96 %    Physical Exam:    /70 (BP Location: Left arm)   Pulse 67   Temp 97.9 °F (36.6 °C) (Oral)   Resp 18   Ht 5' 5\" (1.651 m)   Wt 137 lb 3.2 oz (62.2 kg)   SpO2 96%   BMI 22.83 kg/m²     General: No acute distress.  Jaundice present  HEENT: Moist mucous membranes.  Scleral icterus present  Respiratory: Clear to auscultation bilaterally.  No wheezes. No rhonchi.  Cardiovascular: S1, S2.  Regular rate and rhythm.   Abdomen: Soft, nontender, nondistended.  Positive bowel sounds.   Neurologic: Awake alert, no focal neurological deficits.  Musculoskeletal: Full range of motion of all extremities.  No pedal edema or calf tenderness  Integument: Jaundice present  Psychiatric: Appropriate mood and affect.    Diagnostic Data:    Labs:  Recent Labs   Lab 24  0903 24  0752   WBC 5.8 5.9   HGB 13.0 12.2*    MCV 82.3 89.0   PLT 79.0* 82.0*   INR 0.94  --        Recent Labs   Lab 07/27/24  0903 07/28/24  0752 07/29/24  0643   * 89 92   BUN 9 5* 6*   CREATSERUM 0.99 0.85 0.76   CA 9.3 9.1 9.3   ALB 3.6 3.2 3.3   * 134* 131*   K 3.2* 3.7  3.7 3.8    104 102   CO2 22.0 25.0 25.0   ALKPHO 328* 304* 348*   * 433* 400*   * 183* 203*   BILT 17.6* 14.8* 10.9*   TP 6.4 5.7 6.0       Estimated Creatinine Clearance: 125.9 mL/min (based on SCr of 0.76 mg/dL).    Recent Labs   Lab 07/27/24  0903 07/28/24  0752   * 73       Recent Labs   Lab 07/27/24  0903   PTP 12.5   INR 0.94                  Microbiology    No results found for this visit on 07/27/24.      Imaging: Reviewed in Epic.  CT abdomen and pelvis done on 7/20/2024 showed   CONCLUSION:    1. There is hepatomegaly.  There is diffuse decreased attenuation in the liver consistent with fatty infiltration.  There is no focal hepatic mass.  The elevated liver enzymes could reflect changes related to hepatic steatosis or hepatitis.   2. No biliary ductal dilatation.  The gallbladder is contracted.   3. Infiltration and gas bubbles in the subcutaneous fat of the right anterior abdominal wall likely reflect changes related to recent injections.  Correlate with clinical history. Had some pain at the site of the Lovenox injection yesterday, resolved today according to patient  4.  The remainder of the abdominal and pelvic CT is unremarkable.         Medications:    enoxaparin  40 mg Subcutaneous Daily    thiamine  100 mg Oral Daily    multivitamin with minerals  1 tablet Oral Daily    folic acid  1 mg Oral Daily       Assessment & Plan:        #Elevated liver function test, jaundice, possible alcoholic hepatitis  #Alcohol dependence  Alcohol level less than 3 at this time  Urine drug screen negative  Acute hepatitis C including hepatitis A, B and C nonreactive  Advised quit alcohol, patient verbalized understanding  GI consult  Total bilirubin  improving, AST and ALT higher today.  Follow CMP in a.m.  INR normal at this time  No signs of alcohol withdrawal in last 24 hours as reported by RN, will plan to discontinue CIWA protocol with as needed Ativan  Iron studies showed elevated ferritin and iron saturation.  Hemochromatosis DNA ordered by GI pending  Discussed in detail with patient regarding need to follow-up with gastroenterologist and with regular outpatient primary care physician or TCC clinic after discharge to follow-up on the workup ordered by gastroenterologist which is still pending, patient verbalized understanding.  Used Turkmen telemetry  to communicate this with patient and patient has no questions regarding this.  #Thrombocytopenia due to liver disease  Stable  #Gluteal pain, elevated CK on admission  CT abdomen and pelvis as above  On IV fluids  repeat CK level back to normal    Discussed with patient, discussed with RN.   for discharge planning.    Patient seen with the help of Turkmen speaking RN interpreting for patient per patient's wishes..  Discharged in stable condition, advised to follow liver function test with regular outpatient primary care physician or TCC clinic on follow-up within 3 days and follow labs ordered by gastroenterologist which is still pending with gastroenterologist and TCC clinic as outpatient, patient verbalized understanding    Patricia Lara MD    Supplementary Documentation:     Quality:  DVT Mechanical Prophylaxis:   SCDs,    DVT Pharmacologic Prophylaxis   Medication    enoxaparin (Lovenox) 40 MG/0.4ML SUBQ injection 40 mg                Code Status: Not on file  Hill: No urinary catheter in place  Hill Duration (in days):   Central line:    SOPHIE: 7/29/2024    Discharge is dependent on: Clinical progress  At this point Mr. Cristiana Barajas is expected to be discharge to: Home    The 21st Century Cures Act makes medical notes like these available to patients in the interest of  transparency. Please be advised this is a medical document. Medical documents are intended to carry relevant information, facts as evident, and the clinical opinion of the practitioner. The medical note is intended as peer to peer communication and may appear blunt or direct. It is written in medical language and may contain abbreviations or verbiage that are unfamiliar.

## 2024-07-29 NOTE — PROGRESS NOTES
Assumed care at 7:30am  Denied pain  He states he is ready to go home.   Hoping he can start working soon.   Denying any assistance to help cessation of alcohol, states he has God on his side.   All safety precautions in place. Will continue to monitor patient.    Patient discharged, aware he will need to f/u with GI in 1-3 weeks. Emphasized importance, especially to know the results of genetic testing. He verbalized understanding but now sure if he will follow through.

## 2024-07-29 NOTE — DISCHARGE SUMMARY
[facility]  Discharge Summary    Garcia Barajas Patient Status:  Inpatient    1995 MRN [id number]   Location [facility] 3NE-A Attending Patricia Lara MD   Hosp Day # 2 PCP No primary care provider on file.     Date of Admission: 2024    Date of Discharge: No discharge date for patient encounter.     2024  ***    Disposition: Final discharge disposition not confirmed    Discharge Diagnosis:   [unfilled]   {Enter hospital discharge diagnoses here (please select the wildcards and then replace with free text; this allows us to save this data discretely for reporting purposes:5961::\"***\"}    Chief Complaint:   Chief Complaint   Patient presents with    Pain     Gluteal pain bilaterally    Eye Visual Problem     States eyes are yellow    Fever     Feels feverish    Urinary Symptoms     States urine is orange       History of Present Illness: ***    Brief Synopsis: ***        TCM Diagnosis at discharge from Hospital: {Select the most appropriate high risk diagnosis to describe the hospital stay from this list :4958}    Lace+ Score: 49  59-90 High Risk  29-58 Medium Risk  0-28   Low Risk.     TCM Follow-Up Recommendation:Garcia Barajas   {Care Managers will evaluate the need for follow-up for all patients ages 50+, and high/moderate risk patients ages 25-49. Low risk patients (LACE < 29) will only be evaluated if the \"Still recommend for TCM follow-up\" option is selected from this list.:7396}      PCP: No primary care provider on file.    Consultations: ***      Procedures during hospitalization:   ***    Incidental or significant findings and recommendations (brief descriptions):  ***    Lab/Test results pending at Discharge:   ***      Discharge Medications:        Discharge Medications      You have not been prescribed any medications.          [place] monitoring program data reviewed in epic before prescribing narcotics/controlled substances:  ***    Follow up Visits: Follow-up with No primary care provider on file. in 1 week    Jesus Manuel Lundy MD  [address]  [place] [address]  [phone]    Follow up in 1 week(s)  Follow liver function test and rest of the labs ordered by gastroenterologist which is still pending with gastroenterologist as outpatient.    [facility]  [address] [place] [address] [address] [place] [address] [phone]  Schedule an appointment as soon as possible for a visit in 3 day(s)  Follow liver function test with regular outpatient primary care physician or TCC clinic on follow-up within 3 days.  Follow labs ordered by gastroenterologist which is still pending with gastroenterologist and TCC clinic as outpatient    Appointments for Next 30 Days 7/29/2024 - 8/28/2024      None            Follow up Labs: *** in {0-10:55474} {units:19995} with No primary care provider on file.      Physical Exam:  /70 (BP Location: Left arm)   Pulse 67   Temp 97.9 °F (36.6 °C) (Oral)   Resp 18   Ht 5' 5\" (1.651 m)   Wt 137 lb 3.2 oz (62.2 kg)   SpO2 96%   BMI 22.83 kg/m²       General appearance: alert and cooperative  Head: Normocephalic, without obvious abnormality, atraumatic  Throat: oral mucosa moist  Neck: no adenopathy, no carotid bruit, no JVD  Lungs: clear to auscultation bilaterally  Heart: S1, S2 normal, no murmur,  regular rate and rhythm  Abdomen: soft, non-tender; bowel sounds normal  Extremities: extremities normal,no cyanosis or edema  Pulses: 2+ and symmetric  Skin: Skin color, texture, turgor normal. No rashes or lesions  Neurologic: Awake,alert,nonfocal  Psych: Mood and affect appears normal      Discharge Condition: stable    Patient Discharge Instructions: See electronic chart      More than 30 minutes on discharge    Patricia Lara MD  7/29/2024  12:31 PM     outpatient primary care physician or TCC clinic on follow-up within 3 days and follow labs ordered by gastroenterologist which is still pending with gastroenterologist and TCC clinic as outpatient, patient verbalized understanding      TCM Diagnosis at discharge from Hospital: Other: See above; still recommend for TCM follow-up    Lace+ Score: 49  59-90 High Risk  29-58 Medium Risk  0-28   Low Risk.     TCM Follow-Up Recommendation:Garcia Zendejas Karl   LACE 29-58: Moderate Risk of readmission after discharge from the hospital.      PCP: No primary care provider on file.    Consultations:        Provider   Role Specialty     Jesus Manuel Lundy MD      Consulting Physician GASTROENTEROLOGY       Procedures during hospitalization:   None    Incidental or significant findings and recommendations (brief descriptions):  None    Lab/Test results pending at Discharge:   Pending Labs  Order Current Status   Actin (Smooth Muscle) Antibody In process   Mitochondrial (M2) Antibody In process   Iron studies showed elevated ferritin and iron saturation.  Hemochromatosis DNA ordered by GI pending, this was discussed with patient with the help of  and advised follow-up with GI and regular outpatient primary care patient and TCC clinic regarding outpatient regarding this, patient verbalized understanding.      Discharge Medications:        Discharge Medications      You have not been prescribed any medications.          [place] monitoring program data reviewed in epic before prescribing narcotics/controlled substances: none    Follow up Visits: Follow-up with   Jesus Manuel Lundy MD  [address] [place] [address] [phone]    Follow up in 1 week(s)  Follow liver function test and rest of the labs ordered by gastroenterologist which is still pending with gastroenterologist as outpatient.    [facility]  [facility] [address] [facility] [address]  [facility]  [address]  [phone]  Schedule an appointment as soon as possible for a visit in 3 day(s)  Follow liver function test with regular outpatient primary care physician or TCC clinic on follow-up within 3 days.  Follow labs ordered by gastroenterologist which is still pending with gastroenterologist and TCC clinic as outpatient    Appointments for Next 30 Days 7/29/2024 - 8/28/2024      None            Physical Exam:  /70 (BP Location: Left arm)   Pulse 67   Temp 97.9 °F (36.6 °C) (Oral)   Resp 18   Ht 5' 5\" (1.651 m)   Wt 137 lb 3.2 oz (62.2 kg)   SpO2 96%   BMI 22.83 kg/m²     General: No acute distress.  Jaundice present  HEENT: Moist mucous membranes.  Scleral icterus present  Respiratory: Clear to auscultation bilaterally.  No wheezes. No rhonchi.  Cardiovascular: S1, S2.  Regular rate and rhythm.   Abdomen: Soft, nontender, nondistended.  Positive bowel sounds.   Neurologic: Awake alert, no focal neurological deficits.  Musculoskeletal: Full range of motion of all extremities.  No pedal edema or calf tenderness  Integument: Jaundice present  Psychiatric: Appropriate mood and affect.         Discharge Condition: stable    Patient Discharge Instructions: See electronic chart      More than 30 minutes on discharge    Patricia Lara MD  7/29/2024  12:31 PM

## 2024-07-30 ENCOUNTER — PATIENT OUTREACH (OUTPATIENT)
Dept: CASE MANAGEMENT | Age: 29
End: 2024-07-30

## 2024-07-30 LAB
A-1-ANTITRYPSIN: 229 MG/DL
ACTIN SMOOTH MUSCLE AB: 6 UNITS
M2 MITOCHONDRIAL AB: <20 UNITS

## 2024-07-30 NOTE — PROGRESS NOTES
Attempted to contact pt for condition update however no answer. Call continued to ring and did not go to a . [facility] to try again at a later time.

## 2024-07-31 ENCOUNTER — PATIENT OUTREACH (OUTPATIENT)
Dept: CASE MANAGEMENT | Age: 29
End: 2024-07-31

## 2024-07-31 NOTE — PROGRESS NOTES
NCM attempted to contact the patient for HFU. Phone rang one time and disconnected. NCM will try again another time.